# Patient Record
Sex: FEMALE | Race: WHITE | Employment: OTHER | URBAN - NONMETROPOLITAN AREA
[De-identification: names, ages, dates, MRNs, and addresses within clinical notes are randomized per-mention and may not be internally consistent; named-entity substitution may affect disease eponyms.]

---

## 2017-03-14 ENCOUNTER — OFFICE VISIT (OUTPATIENT)
Dept: CARDIOLOGY | Age: 77
End: 2017-03-14

## 2017-03-14 ENCOUNTER — PROCEDURE VISIT (OUTPATIENT)
Dept: CARDIOLOGY | Age: 77
End: 2017-03-14

## 2017-03-14 VITALS
SYSTOLIC BLOOD PRESSURE: 128 MMHG | WEIGHT: 152.4 LBS | HEART RATE: 71 BPM | DIASTOLIC BLOOD PRESSURE: 66 MMHG | HEIGHT: 66 IN | BODY MASS INDEX: 24.49 KG/M2

## 2017-03-14 DIAGNOSIS — Z95.810 BIVENTRICULAR IMPLANTABLE CARDIOVERTER-DEFIBRILLATOR IN SITU: Primary | ICD-10-CM

## 2017-03-14 DIAGNOSIS — I48.0 PAROXYSMAL ATRIAL FIBRILLATION (HCC): ICD-10-CM

## 2017-03-14 DIAGNOSIS — I10 ESSENTIAL HYPERTENSION: ICD-10-CM

## 2017-03-14 DIAGNOSIS — I34.0 MITRAL VALVE INSUFFICIENCY, UNSPECIFIED ETIOLOGY: ICD-10-CM

## 2017-03-14 DIAGNOSIS — I42.9 CARDIOMYOPATHY (HCC): Primary | ICD-10-CM

## 2017-03-14 DIAGNOSIS — I44.7 LBBB (LEFT BUNDLE BRANCH BLOCK): ICD-10-CM

## 2017-03-14 DIAGNOSIS — I50.22 CHRONIC SYSTOLIC CONGESTIVE HEART FAILURE (HCC): ICD-10-CM

## 2017-03-14 DIAGNOSIS — I35.1 AORTIC VALVE INSUFFICIENCY, UNSPECIFIED ETIOLOGY: ICD-10-CM

## 2017-03-14 DIAGNOSIS — Z95.810 BIVENTRICULAR IMPLANTABLE CARDIOVERTER-DEFIBRILLATOR IN SITU: ICD-10-CM

## 2017-03-14 DIAGNOSIS — Z98.890 H/O CARDIAC CATHETERIZATION: ICD-10-CM

## 2017-03-14 PROCEDURE — 93283 PRGRMG EVAL IMPLANTABLE DFB: CPT | Performed by: INTERNAL MEDICINE

## 2017-03-14 PROCEDURE — G8484 FLU IMMUNIZE NO ADMIN: HCPCS | Performed by: INTERNAL MEDICINE

## 2017-03-14 PROCEDURE — G8420 CALC BMI NORM PARAMETERS: HCPCS | Performed by: INTERNAL MEDICINE

## 2017-03-14 PROCEDURE — 1123F ACP DISCUSS/DSCN MKR DOCD: CPT | Performed by: INTERNAL MEDICINE

## 2017-03-14 PROCEDURE — 99213 OFFICE O/P EST LOW 20 MIN: CPT | Performed by: INTERNAL MEDICINE

## 2017-03-14 PROCEDURE — 1090F PRES/ABSN URINE INCON ASSESS: CPT | Performed by: INTERNAL MEDICINE

## 2017-03-14 PROCEDURE — G8427 DOCREV CUR MEDS BY ELIG CLIN: HCPCS | Performed by: INTERNAL MEDICINE

## 2017-03-14 PROCEDURE — 1036F TOBACCO NON-USER: CPT | Performed by: INTERNAL MEDICINE

## 2017-03-14 PROCEDURE — G8400 PT W/DXA NO RESULTS DOC: HCPCS | Performed by: INTERNAL MEDICINE

## 2017-03-14 PROCEDURE — 4040F PNEUMOC VAC/ADMIN/RCVD: CPT | Performed by: INTERNAL MEDICINE

## 2017-03-14 ASSESSMENT — ENCOUNTER SYMPTOMS
GASTROINTESTINAL NEGATIVE: 1
SHORTNESS OF BREATH: 1
EYES NEGATIVE: 1

## 2017-05-17 ENCOUNTER — PROCEDURE VISIT (OUTPATIENT)
Dept: CARDIOLOGY | Age: 77
End: 2017-05-17

## 2017-05-17 DIAGNOSIS — Z95.810 BIVENTRICULAR IMPLANTABLE CARDIOVERTER-DEFIBRILLATOR IN SITU: Primary | ICD-10-CM

## 2017-05-17 PROCEDURE — 93295 DEV INTERROG REMOTE 1/2/MLT: CPT | Performed by: INTERNAL MEDICINE

## 2017-05-17 PROCEDURE — 93296 REM INTERROG EVL PM/IDS: CPT | Performed by: INTERNAL MEDICINE

## 2017-08-23 ENCOUNTER — PROCEDURE VISIT (OUTPATIENT)
Dept: CARDIOLOGY CLINIC | Age: 77
End: 2017-08-23
Payer: MEDICARE

## 2017-08-23 DIAGNOSIS — Z95.810 BIVENTRICULAR IMPLANTABLE CARDIOVERTER-DEFIBRILLATOR IN SITU: Primary | ICD-10-CM

## 2017-08-23 PROCEDURE — 93296 REM INTERROG EVL PM/IDS: CPT | Performed by: INTERNAL MEDICINE

## 2017-08-23 PROCEDURE — 93295 DEV INTERROG REMOTE 1/2/MLT: CPT | Performed by: INTERNAL MEDICINE

## 2017-11-29 ENCOUNTER — PROCEDURE VISIT (OUTPATIENT)
Dept: CARDIOLOGY CLINIC | Age: 77
End: 2017-11-29
Payer: MEDICARE

## 2017-11-29 DIAGNOSIS — Z95.810 BIVENTRICULAR IMPLANTABLE CARDIOVERTER-DEFIBRILLATOR IN SITU: Primary | ICD-10-CM

## 2017-11-29 PROCEDURE — 93296 REM INTERROG EVL PM/IDS: CPT | Performed by: INTERNAL MEDICINE

## 2017-11-29 PROCEDURE — 93295 DEV INTERROG REMOTE 1/2/MLT: CPT | Performed by: INTERNAL MEDICINE

## 2017-11-29 NOTE — PROGRESS NOTES
DR Leanne Greene PT/AFIB Samantha Marquez  [place] [employer] BIV ICD  BATTERY 2.5 YRS REMAINING  ATRIAL IMPEDENCE 443  RV IMPEDENCE 461  LV IMPEDENCE 456  P WAVES 0.8    SHOCK 51  ATRIAL PACED 0%  RV PACED 99%  LV PACED 100%

## 2018-01-24 ENCOUNTER — OFFICE VISIT (OUTPATIENT)
Dept: CARDIOLOGY CLINIC | Age: 78
End: 2018-01-24
Payer: MEDICARE

## 2018-01-24 VITALS
BODY MASS INDEX: 24.91 KG/M2 | DIASTOLIC BLOOD PRESSURE: 72 MMHG | HEART RATE: 71 BPM | HEIGHT: 66 IN | SYSTOLIC BLOOD PRESSURE: 132 MMHG | WEIGHT: 155 LBS

## 2018-01-24 DIAGNOSIS — E78.5 HYPERLIPIDEMIA, UNSPECIFIED HYPERLIPIDEMIA TYPE: ICD-10-CM

## 2018-01-24 DIAGNOSIS — I44.7 LBBB (LEFT BUNDLE BRANCH BLOCK): ICD-10-CM

## 2018-01-24 DIAGNOSIS — I35.1 AORTIC VALVE INSUFFICIENCY, ETIOLOGY OF CARDIAC VALVE DISEASE UNSPECIFIED: ICD-10-CM

## 2018-01-24 DIAGNOSIS — I42.9 CARDIOMYOPATHY, UNSPECIFIED TYPE (HCC): Primary | ICD-10-CM

## 2018-01-24 DIAGNOSIS — Z98.890 H/O CARDIAC CATHETERIZATION: ICD-10-CM

## 2018-01-24 DIAGNOSIS — Z95.810 BIVENTRICULAR IMPLANTABLE CARDIOVERTER-DEFIBRILLATOR IN SITU: ICD-10-CM

## 2018-01-24 DIAGNOSIS — I48.0 PAROXYSMAL ATRIAL FIBRILLATION (HCC): ICD-10-CM

## 2018-01-24 DIAGNOSIS — I10 ESSENTIAL HYPERTENSION: ICD-10-CM

## 2018-01-24 DIAGNOSIS — I34.0 MITRAL VALVE INSUFFICIENCY, UNSPECIFIED ETIOLOGY: ICD-10-CM

## 2018-01-24 PROCEDURE — 93000 ELECTROCARDIOGRAM COMPLETE: CPT | Performed by: INTERNAL MEDICINE

## 2018-01-24 PROCEDURE — 1036F TOBACCO NON-USER: CPT | Performed by: INTERNAL MEDICINE

## 2018-01-24 PROCEDURE — G8427 DOCREV CUR MEDS BY ELIG CLIN: HCPCS | Performed by: INTERNAL MEDICINE

## 2018-01-24 PROCEDURE — 1090F PRES/ABSN URINE INCON ASSESS: CPT | Performed by: INTERNAL MEDICINE

## 2018-01-24 PROCEDURE — G8419 CALC BMI OUT NRM PARAM NOF/U: HCPCS | Performed by: INTERNAL MEDICINE

## 2018-01-24 PROCEDURE — 4040F PNEUMOC VAC/ADMIN/RCVD: CPT | Performed by: INTERNAL MEDICINE

## 2018-01-24 PROCEDURE — 1123F ACP DISCUSS/DSCN MKR DOCD: CPT | Performed by: INTERNAL MEDICINE

## 2018-01-24 PROCEDURE — G8484 FLU IMMUNIZE NO ADMIN: HCPCS | Performed by: INTERNAL MEDICINE

## 2018-01-24 PROCEDURE — 99214 OFFICE O/P EST MOD 30 MIN: CPT | Performed by: INTERNAL MEDICINE

## 2018-01-24 PROCEDURE — G8400 PT W/DXA NO RESULTS DOC: HCPCS | Performed by: INTERNAL MEDICINE

## 2018-01-24 ASSESSMENT — ENCOUNTER SYMPTOMS
EYES NEGATIVE: 1
SHORTNESS OF BREATH: 1
GASTROINTESTINAL NEGATIVE: 1

## 2018-01-24 NOTE — PROGRESS NOTES
No results for input(s): CKTOTAL, CKMB, CKMBINDEX, TROPONINI in the last 72 hours. Assessment:   Dario Scott is known to us with history of Bi-V ICD implantation in 2006 for non-ischemic CPM, and history of paroxysmal atrial fibrillation as well as ventricular tachycardia. She had cardiac cath in 2006 which showed no obstructive CAD. She was found to be in atrial flutter on 2/19/2013, and she underwent successful LINWOOD-[facility] which restored sinus rhythm. She has been tolerating anticoagulation with Xarelto but she is c/o the cost of this medicine. We helped her with signing up for free samples. She reported no new symptoms such SOB or palpitations. No chest pain. She was found to be in atrial fib on 4/8/2015. She was started on amiodarone and we scheduled LINWOOD/cardioversion. This was done successfully and she went back into sinus rhythm. The following diagnoses were addressed during this visit:  1. Cardiomyopathy, unspecified type ([address]  EKG 12 Lead   2. Paroxysmal atrial fibrillation (HCC)  EKG 12 Lead    Cardioversion external   3. [place] ICD     4. Essential hypertension     5. Hyperlipidemia, unspecified hyperlipidemia type     6. LBBB (left bundle branch block)     7. Aortic valve insufficiency, etiology of cardiac valve disease unspecified     8. Mitral valve insufficiency, unspecified etiology     9. H/O cardiac catheterization: 7/21/2006. No obstructive CAD. EF 25%           Plan:   Patient here for 6 month follow-up. Patient has intermittent palpitations.     EKG completed in office shows atypical atrial flutter. She is in atrial flutter. We will attempt to cardiovert the heart into SR by external [facility]. She is on xarelto and she has been taking it religiously. On last device check 2017 showed atrial flutter. It is atypical flutter. She is on Xarelto anyway. Ablation may not be the best option for this elderly patient. Medications reviewed.    Continue

## 2018-01-29 ENCOUNTER — APPOINTMENT (OUTPATIENT)
Dept: CARDIAC CATH/INVASIVE PROCEDURES | Age: 78
End: 2018-01-29
Attending: INTERNAL MEDICINE
Payer: MEDICARE

## 2018-01-29 ENCOUNTER — ANESTHESIA (OUTPATIENT)
Dept: CARDIAC CATH/INVASIVE PROCEDURES | Age: 78
End: 2018-01-29
Payer: MEDICARE

## 2018-01-29 ENCOUNTER — ANESTHESIA EVENT (OUTPATIENT)
Dept: CARDIAC CATH/INVASIVE PROCEDURES | Age: 78
End: 2018-01-29
Payer: MEDICARE

## 2018-01-29 ENCOUNTER — HOSPITAL ENCOUNTER (OUTPATIENT)
Dept: INPATIENT UNIT | Age: 78
Discharge: HOME | End: 2018-01-29
Attending: INTERNAL MEDICINE | Admitting: INTERNAL MEDICINE
Payer: MEDICARE

## 2018-01-29 VITALS
OXYGEN SATURATION: 94 % | DIASTOLIC BLOOD PRESSURE: 70 MMHG | BODY MASS INDEX: 24.91 KG/M2 | HEART RATE: 70 BPM | HEIGHT: 66 IN | WEIGHT: 155 LBS | TEMPERATURE: 97.9 F | SYSTOLIC BLOOD PRESSURE: 137 MMHG | RESPIRATION RATE: 23 BRPM

## 2018-01-29 PROBLEM — Z01.89 ENCOUNTER FOR CARDIOVERSION PROCEDURE: Status: ACTIVE | Noted: 2018-01-29

## 2018-01-29 LAB
ANION GAP SERPL CALCULATED.3IONS-SCNC: 12 MEQ/L (ref 8–16)
BUN BLDV-MCNC: 13 MG/DL (ref 7–22)
CALCIUM SERPL-MCNC: 9.4 MG/DL (ref 8.5–10.5)
CHLORIDE BLD-SCNC: 103 MEQ/L (ref 98–111)
CO2: 27 MEQ/L (ref 23–33)
CREAT SERPL-MCNC: 0.9 MG/DL (ref 0.4–1.2)
GFR SERPL CREATININE-BSD FRML MDRD: 60 ML/MIN/1.73M2
GLUCOSE BLD-MCNC: 109 MG/DL (ref 70–108)
MAGNESIUM: 2.3 MG/DL (ref 1.6–2.4)
POTASSIUM SERPL-SCNC: 4.1 MEQ/L (ref 3.5–5.2)
SODIUM BLD-SCNC: 142 MEQ/L (ref 135–145)

## 2018-01-29 PROCEDURE — 92960 CARDIOVERSION ELECTRIC EXT: CPT | Performed by: INTERNAL MEDICINE

## 2018-01-29 PROCEDURE — 93005 ELECTROCARDIOGRAM TRACING: CPT

## 2018-01-29 PROCEDURE — 83735 ASSAY OF MAGNESIUM: CPT

## 2018-01-29 PROCEDURE — 36415 COLL VENOUS BLD VENIPUNCTURE: CPT

## 2018-01-29 PROCEDURE — 2580000003 HC RX 258: Performed by: INTERNAL MEDICINE

## 2018-01-29 PROCEDURE — 6360000002 HC RX W HCPCS: Performed by: ANESTHESIOLOGY

## 2018-01-29 PROCEDURE — 3700000000 HC ANESTHESIA ATTENDED CARE

## 2018-01-29 PROCEDURE — 80048 BASIC METABOLIC PNL TOTAL CA: CPT

## 2018-01-29 PROCEDURE — 6360000002 HC RX W HCPCS: Performed by: INTERNAL MEDICINE

## 2018-01-29 RX ORDER — SODIUM CHLORIDE 0.9 % (FLUSH) 0.9 %
10 SYRINGE (ML) INJECTION EVERY 12 HOURS SCHEDULED
Status: DISCONTINUED | OUTPATIENT
Start: 2018-01-29 | End: 2018-01-29 | Stop reason: HOSPADM

## 2018-01-29 RX ORDER — FUROSEMIDE 10 MG/ML
20 INJECTION INTRAMUSCULAR; INTRAVENOUS ONCE
Status: DISCONTINUED | OUTPATIENT
Start: 2018-01-29 | End: 2018-01-29 | Stop reason: HOSPADM

## 2018-01-29 RX ORDER — SODIUM CHLORIDE 9 MG/ML
INJECTION, SOLUTION INTRAVENOUS CONTINUOUS
Status: DISCONTINUED | OUTPATIENT
Start: 2018-01-29 | End: 2018-01-29 | Stop reason: HOSPADM

## 2018-01-29 RX ORDER — SODIUM CHLORIDE 0.9 % (FLUSH) 0.9 %
10 SYRINGE (ML) INJECTION PRN
Status: DISCONTINUED | OUTPATIENT
Start: 2018-01-29 | End: 2018-01-29 | Stop reason: HOSPADM

## 2018-01-29 RX ORDER — FUROSEMIDE 10 MG/ML
20 INJECTION INTRAMUSCULAR; INTRAVENOUS ONCE
Status: COMPLETED | OUTPATIENT
Start: 2018-01-29 | End: 2018-01-29

## 2018-01-29 RX ADMIN — PROPOFOL 10 MG: 10 INJECTION, EMULSION INTRAVENOUS at 11:03

## 2018-01-29 RX ADMIN — PROPOFOL 50 MG: 10 INJECTION, EMULSION INTRAVENOUS at 11:02

## 2018-01-29 RX ADMIN — PROPOFOL 10 MG: 10 INJECTION, EMULSION INTRAVENOUS at 11:06

## 2018-01-29 RX ADMIN — PROPOFOL 10 MG: 10 INJECTION, EMULSION INTRAVENOUS at 11:04

## 2018-01-29 RX ADMIN — PROPOFOL 10 MG: 10 INJECTION, EMULSION INTRAVENOUS at 11:05

## 2018-01-29 RX ADMIN — SODIUM CHLORIDE: 9 INJECTION, SOLUTION INTRAVENOUS at 09:16

## 2018-01-29 RX ADMIN — FUROSEMIDE 20 MG: 10 INJECTION, SOLUTION INTRAMUSCULAR; INTRAVENOUS at 11:19

## 2018-01-29 ASSESSMENT — ENCOUNTER SYMPTOMS: SHORTNESS OF BREATH: 1

## 2018-01-29 NOTE — ANESTHESIA PRE PROCEDURE
breath) R06.02    CHF (congestive heart failure) ([facility]) I50.9    LBBB (left bundle branch block) I44.7    Atrial fibrillation ([facility]) I48.91    AI (aortic insufficiency): Mild by echo 5/2012 I35.1    MR (mitral regurgitation): Mild by echo 5/2012 I34.0    H/O echocardiogram: 5/2012 Z92.89    H/O cardiac catheterization: 7/21/2006. No obstructive CAD. EF 25% Z98.890       Past Medical History:        Diagnosis Date    AI (aortic insufficiency): Mild by echo 5/2012 3/25/2013    Mild by Echocardiogram 5/2012    Arthritis     Atrial fibrillation ([address]     [place] [employer] BiV ICD     5/3/2012: Pulse generator replacement. Biv ICD. Assistera. Dr. Samia Moreland  8/3/2006    Cardiomyopathy ([address] 07/21/06    EF 25%    CHF (congestive heart failure) ([address]     functional class III     Gastroesophageal reflux     H/O echocardiogram: 5/2012 3/25/2013    5/2012: EF 50%. Mild MR and mild AI.     Hepatitis     history of    Hyperlipidemia     Hypertension     ICD (implantable cardiac defibrillator), biventricular, in situ 8/3/06    [employer] BiV ICD    LBBB (left bundle branch block)     MR (mitral regurgitation): Mild by echo 5/2012 3/25/2013    Mild by echo 5/2012    SOB (shortness of breath)     VT (ventricular tachycardia) ([address]        Past Surgical History:        Procedure Laterality Date    DIAGNOSTIC CARDIAC CATH LAB PROCEDURE  7/21/06    nonobstructive disease, severe LV systolic dysfunction, mildly elevated LV end diastolic pressure    HYSTERECTOMY  1989    TRANSESOPHAGEAL ECHOCARDIOGRAM  2/19/13    with cardioversion [facility]    TRANSTHORACIC ECHOCARDIOGRAM  10/21/05    EF 25% mild aortic sclerostic changes, mitral valve thickened and calcification present, mild/mod mitral insufficiency severe tricuspid insufficiency, pulmonary htn, mild mitral stenosis and insufficiency       Social History:    Social History   Substance Use Topics    Smoking status: Never Smoker    Smokeless tobacco: Never Used

## 2018-01-29 NOTE — ANESTHESIA POSTPROCEDURE EVALUATION
Department of Anesthesiology  Postprocedure Note    Patient: Dario Scott  MRN: [id number]  YOB: 1940  Date of evaluation: 1/29/2018  Time:  11:48 AM     Procedure Summary     Date:  01/29/18 Room / Location:  [address] [place] [address] CATH LAB    Anesthesia Start:  1100 Anesthesia Stop:  9607    Procedure:  STR CARDIOVERSION W/ ANES Diagnosis:      Scheduled Providers:   Responsible Provider:  Yani Ramírez MD    Anesthesia Type:  general ASA Status:  4          Anesthesia Type: general    Addison Phase I:      Addison Phase II:      Last vitals: Reviewed and per EMR flowsheets. Anesthesia Post Evaluation   [address]  POST-ANESTHESIA NOTE       Name:  Dario Scott                                         Age:  76 y.o.   MRN:  [id number]      Last Vitals:  /74   Pulse 70   Temp 97.9 °F (36.6 °C) (Oral)   Resp 15   Ht 5' 6\" (1.676 m)   Wt 155 lb (70.3 kg)   SpO2 99%   BMI 25.02 kg/m²   Patient Vitals for the past 4 hrs:   BP Temp Temp src Pulse Resp SpO2 Height Weight   01/29/18 1117 134/74 - - 70 15 99 % - -   01/29/18 1112 127/67 - - 70 21 98 % - -   01/29/18 1108 129/76 - - 70 (!) 41 97 % - -   01/29/18 1104 (!) 162/78 - - 73 24 97 % - -   01/29/18 1100 - - - 70 17 97 % - -   01/29/18 0845 (!) 152/89 97.9 °F (36.6 °C) Oral 71 16 96 % 5' 6\" (1.676 m) 155 lb (70.3 kg)       Level of Consciousness:  Awake    Respiratory:  Stable    Oxygen Saturation:  Stable    Cardiovascular:  Stable    Hydration:  Adequate    PONV:  Stable    Post-op Pain:  Adequate analgesia    Post-op Assessment:  No apparent anesthetic complications    Additional Follow-Up / Treatment / Comment:  None    Yani Ramírez MD  January 29, 2018   11:48 AM

## 2018-01-29 NOTE — PROGRESS NOTES
IV was positional and IV fluid infused quickly, Dr Salvador Anis informed, 250 ml normal saline hung at 20 ml per hour on a pump.

## 2018-01-31 LAB
EKG ATRIAL RATE: 375 BPM
EKG ATRIAL RATE: 70 BPM
EKG P-R INTERVAL: 304 MS
EKG Q-T INTERVAL: 440 MS
EKG Q-T INTERVAL: 446 MS
EKG QRS DURATION: 168 MS
EKG QRS DURATION: 170 MS
EKG QTC CALCULATION (BAZETT): 475 MS
EKG QTC CALCULATION (BAZETT): 481 MS
EKG R AXIS: -114 DEGREES
EKG R AXIS: -128 DEGREES
EKG T AXIS: 103 DEGREES
EKG T AXIS: 44 DEGREES
EKG VENTRICULAR RATE: 70 BPM
EKG VENTRICULAR RATE: 70 BPM

## 2018-02-02 NOTE — H&P
Nii Montalvo MD   aspirin 81 MG tablet Take 6 tablets by mouth daily. Patient taking differently: Take 81 mg by mouth daily  2/19/13  Yes Nii Montalvo MD     Additional information:       VITAL SIGNS   Vitals:    01/29/18 1317   BP: 137/70   Pulse: 70   Resp: 23   Temp:    SpO2:        PHYSICAL:   Heart:  [x]Regular rate and rhythm  []Other:    Lungs:  [x]Clear    []Other:    Abdomen: [x]Soft    []Other:    Mental Status: [x]Alert & Oriented  []Other:      PLANNED PROCEDURE   []LINWOOD  []Pacemaker/ICD [x]Cardioversion  []Cath  []PCI   []LOOP RECORDER INSERTION OR REMOVAL  []Peripheral angiography      SEDATION  Planned agent:[x]Midazolam []Meperidine [x]Sublimaze []Morphine  []Diazepam  []Other:     ASA Classification:  []1 []2 [x]3 []4 []5  Class 1: A normal healthy patient  Class 2: Pt with mild to moderate systemic disease  Class 3: Severe systemic disease or disturbance  Class 4: Severe systemic disorders that are already life threatening. Class 5: Moribund pt with little chances of survival, for more than 24 hours. Mallampati I Airway Classification:   []1 []2 [x]3 []4    [x]Pre-procedure diagnostic studies complete and results available. Comment:    [x]Previous sedation/anesthesia experiences assessed. Comment:    [x]The patient is an appropriate candidate to undergo the planned procedure sedation and anesthesia. (Refer to nursing sedation/analgesia documentation record)  [x]Formulation and discussion of sedation/procedure plan, risks, and expectations with patient and/or responsible adult completed. [x]Patient examined immediately prior to the procedure.  (Refer to nursing sedation/analgesia documentation record)    Nii Montalvo  Electronically signed 2/1/2018 at 8:02 PM

## 2018-03-20 ENCOUNTER — NURSE ONLY (OUTPATIENT)
Dept: CARDIOLOGY CLINIC | Age: 78
End: 2018-03-20
Payer: MEDICARE

## 2018-03-20 DIAGNOSIS — Z95.810 BIVENTRICULAR IMPLANTABLE CARDIOVERTER-DEFIBRILLATOR IN SITU: Primary | ICD-10-CM

## 2018-03-20 PROCEDURE — 93284 PRGRMG EVAL IMPLANTABLE DFB: CPT | Performed by: INTERNAL MEDICINE

## 2018-04-28 ENCOUNTER — HOSPITAL ENCOUNTER (EMERGENCY)
Age: 78
Discharge: HOME | End: 2018-04-28
Payer: MEDICARE

## 2018-04-28 ENCOUNTER — APPOINTMENT (OUTPATIENT)
Dept: GENERAL RADIOLOGY | Age: 78
End: 2018-04-28
Payer: MEDICARE

## 2018-04-28 ENCOUNTER — APPOINTMENT (OUTPATIENT)
Dept: CT IMAGING | Age: 78
End: 2018-04-28
Payer: MEDICARE

## 2018-04-28 VITALS
BODY MASS INDEX: 23.89 KG/M2 | DIASTOLIC BLOOD PRESSURE: 80 MMHG | TEMPERATURE: 97.8 F | RESPIRATION RATE: 11 BRPM | HEART RATE: 72 BPM | OXYGEN SATURATION: 97 % | WEIGHT: 148 LBS | SYSTOLIC BLOOD PRESSURE: 137 MMHG

## 2018-04-28 DIAGNOSIS — R07.89 ATYPICAL CHEST PAIN: Primary | ICD-10-CM

## 2018-04-28 LAB
ALBUMIN SERPL-MCNC: 4.2 G/DL (ref 3.5–5.1)
ALP BLD-CCNC: 67 U/L (ref 38–126)
ALT SERPL-CCNC: 14 U/L (ref 11–66)
ANION GAP SERPL CALCULATED.3IONS-SCNC: 14 MEQ/L (ref 8–16)
AST SERPL-CCNC: 20 U/L (ref 5–40)
BASOPHILS # BLD: 1.5 %
BASOPHILS ABSOLUTE: 0.1 THOU/MM3 (ref 0–0.1)
BILIRUB SERPL-MCNC: 0.5 MG/DL (ref 0.3–1.2)
BILIRUBIN DIRECT: < 0.2 MG/DL (ref 0–0.3)
BUN BLDV-MCNC: 21 MG/DL (ref 7–22)
CALCIUM SERPL-MCNC: 9.2 MG/DL (ref 8.5–10.5)
CHLORIDE BLD-SCNC: 98 MEQ/L (ref 98–111)
CO2: 26 MEQ/L (ref 23–33)
CREAT SERPL-MCNC: 0.8 MG/DL (ref 0.4–1.2)
DIGOXIN LEVEL: 1.4 NG/ML (ref 0.5–2)
EKG ATRIAL RATE: 74 BPM
EKG Q-T INTERVAL: 438 MS
EKG QRS DURATION: 166 MS
EKG QTC CALCULATION (BAZETT): 486 MS
EKG R AXIS: -154 DEGREES
EKG T AXIS: 60 DEGREES
EKG VENTRICULAR RATE: 74 BPM
EOSINOPHIL # BLD: 4 %
EOSINOPHILS ABSOLUTE: 0.3 THOU/MM3 (ref 0–0.4)
GFR SERPL CREATININE-BSD FRML MDRD: 69 ML/MIN/1.73M2
GLUCOSE BLD-MCNC: 102 MG/DL (ref 70–108)
HCT VFR BLD CALC: 42.1 % (ref 37–47)
HEMOGLOBIN: 14.2 GM/DL (ref 12–16)
LYMPHOCYTES # BLD: 25.5 %
LYMPHOCYTES ABSOLUTE: 1.9 THOU/MM3 (ref 1–4.8)
MACROCYTES: ABNORMAL
MCH RBC QN AUTO: 33.9 PG (ref 27–31)
MCHC RBC AUTO-ENTMCNC: 33.8 GM/DL (ref 33–37)
MCV RBC AUTO: 100.4 FL (ref 81–99)
MONOCYTES # BLD: 7.5 %
MONOCYTES ABSOLUTE: 0.6 THOU/MM3 (ref 0.4–1.3)
NUCLEATED RED BLOOD CELLS: 0 /100 WBC
OSMOLALITY CALCULATION: 278.8 MOSMOL/KG (ref 275–300)
PDW BLD-RTO: 13 % (ref 11.5–14.5)
PLATELET # BLD: 240 THOU/MM3 (ref 130–400)
PMV BLD AUTO: 8.1 FL (ref 7.4–10.4)
POTASSIUM SERPL-SCNC: 4.2 MEQ/L (ref 3.5–5.2)
RBC # BLD: 4.19 MILL/MM3 (ref 4.2–5.4)
SEG NEUTROPHILS: 61.5 %
SEGMENTED NEUTROPHILS ABSOLUTE COUNT: 4.6 THOU/MM3 (ref 1.8–7.7)
SODIUM BLD-SCNC: 138 MEQ/L (ref 135–145)
TOTAL PROTEIN: 6.7 G/DL (ref 6.1–8)
TROPONIN T: < 0.01 NG/ML
WBC # BLD: 7.4 THOU/MM3 (ref 4.8–10.8)

## 2018-04-28 PROCEDURE — 36415 COLL VENOUS BLD VENIPUNCTURE: CPT

## 2018-04-28 PROCEDURE — 71045 X-RAY EXAM CHEST 1 VIEW: CPT

## 2018-04-28 PROCEDURE — 85025 COMPLETE CBC W/AUTO DIFF WBC: CPT

## 2018-04-28 PROCEDURE — 80053 COMPREHEN METABOLIC PANEL: CPT

## 2018-04-28 PROCEDURE — 93005 ELECTROCARDIOGRAM TRACING: CPT | Performed by: EMERGENCY MEDICINE

## 2018-04-28 PROCEDURE — 99285 EMERGENCY DEPT VISIT HI MDM: CPT

## 2018-04-28 PROCEDURE — 82248 BILIRUBIN DIRECT: CPT

## 2018-04-28 PROCEDURE — 80162 ASSAY OF DIGOXIN TOTAL: CPT

## 2018-04-28 PROCEDURE — 84484 ASSAY OF TROPONIN QUANT: CPT

## 2018-04-28 PROCEDURE — 70450 CT HEAD/BRAIN W/O DYE: CPT

## 2018-04-28 ASSESSMENT — HEART SCORE: ECG: 1

## 2018-04-28 ASSESSMENT — ENCOUNTER SYMPTOMS
VOMITING: 0
EYE PAIN: 0
EYE DISCHARGE: 0
SHORTNESS OF BREATH: 0
RHINORRHEA: 0
NAUSEA: 0
ABDOMINAL PAIN: 0
SORE THROAT: 0
BACK PAIN: 0
DIARRHEA: 0
COUGH: 0
WHEEZING: 0

## 2018-06-27 ENCOUNTER — PROCEDURE VISIT (OUTPATIENT)
Dept: CARDIOLOGY CLINIC | Age: 78
End: 2018-06-27

## 2018-06-27 DIAGNOSIS — Z95.810 BIVENTRICULAR IMPLANTABLE CARDIOVERTER-DEFIBRILLATOR IN SITU: Primary | ICD-10-CM

## 2018-10-03 ENCOUNTER — PROCEDURE VISIT (OUTPATIENT)
Dept: CARDIOLOGY CLINIC | Age: 78
End: 2018-10-03
Payer: MEDICARE

## 2018-10-03 DIAGNOSIS — Z95.810 BIVENTRICULAR IMPLANTABLE CARDIOVERTER-DEFIBRILLATOR IN SITU: Primary | ICD-10-CM

## 2018-10-03 PROCEDURE — 93295 DEV INTERROG REMOTE 1/2/MLT: CPT | Performed by: INTERNAL MEDICINE

## 2018-10-03 PROCEDURE — 93296 REM INTERROG EVL PM/IDS: CPT | Performed by: INTERNAL MEDICINE

## 2018-11-27 ENCOUNTER — OFFICE VISIT (OUTPATIENT)
Dept: CARDIOLOGY CLINIC | Age: 78
End: 2018-11-27
Payer: MEDICARE

## 2018-11-27 VITALS
HEIGHT: 64 IN | WEIGHT: 139.4 LBS | SYSTOLIC BLOOD PRESSURE: 116 MMHG | DIASTOLIC BLOOD PRESSURE: 60 MMHG | BODY MASS INDEX: 23.8 KG/M2 | HEART RATE: 70 BPM

## 2018-11-27 DIAGNOSIS — I48.0 PAROXYSMAL ATRIAL FIBRILLATION (HCC): ICD-10-CM

## 2018-11-27 DIAGNOSIS — Z98.890 H/O CARDIAC CATHETERIZATION: ICD-10-CM

## 2018-11-27 DIAGNOSIS — I10 ESSENTIAL HYPERTENSION: ICD-10-CM

## 2018-11-27 DIAGNOSIS — I50.22 CHRONIC SYSTOLIC CONGESTIVE HEART FAILURE (HCC): ICD-10-CM

## 2018-11-27 DIAGNOSIS — Z92.89 H/O ECHOCARDIOGRAM: ICD-10-CM

## 2018-11-27 DIAGNOSIS — I44.7 LBBB (LEFT BUNDLE BRANCH BLOCK): ICD-10-CM

## 2018-11-27 DIAGNOSIS — Z95.810 BIVENTRICULAR IMPLANTABLE CARDIOVERTER-DEFIBRILLATOR IN SITU: ICD-10-CM

## 2018-11-27 DIAGNOSIS — E78.00 PURE HYPERCHOLESTEROLEMIA: ICD-10-CM

## 2018-11-27 DIAGNOSIS — I42.0 NONISCHEMIC DILATED CARDIOMYOPATHY (HCC): Primary | ICD-10-CM

## 2018-11-27 PROCEDURE — 1123F ACP DISCUSS/DSCN MKR DOCD: CPT | Performed by: INTERNAL MEDICINE

## 2018-11-27 PROCEDURE — 1036F TOBACCO NON-USER: CPT | Performed by: INTERNAL MEDICINE

## 2018-11-27 PROCEDURE — G8420 CALC BMI NORM PARAMETERS: HCPCS | Performed by: INTERNAL MEDICINE

## 2018-11-27 PROCEDURE — 99214 OFFICE O/P EST MOD 30 MIN: CPT | Performed by: INTERNAL MEDICINE

## 2018-11-27 PROCEDURE — 4040F PNEUMOC VAC/ADMIN/RCVD: CPT | Performed by: INTERNAL MEDICINE

## 2018-11-27 PROCEDURE — G8484 FLU IMMUNIZE NO ADMIN: HCPCS | Performed by: INTERNAL MEDICINE

## 2018-11-27 PROCEDURE — G8400 PT W/DXA NO RESULTS DOC: HCPCS | Performed by: INTERNAL MEDICINE

## 2018-11-27 PROCEDURE — 93000 ELECTROCARDIOGRAM COMPLETE: CPT | Performed by: INTERNAL MEDICINE

## 2018-11-27 PROCEDURE — 1090F PRES/ABSN URINE INCON ASSESS: CPT | Performed by: INTERNAL MEDICINE

## 2018-11-27 PROCEDURE — 1101F PT FALLS ASSESS-DOCD LE1/YR: CPT | Performed by: INTERNAL MEDICINE

## 2018-11-27 PROCEDURE — G8427 DOCREV CUR MEDS BY ELIG CLIN: HCPCS | Performed by: INTERNAL MEDICINE

## 2018-11-27 NOTE — PROGRESS NOTES
Chief Complaint   Patient presents with    6 Month Follow-Up    Check-Up   Former pat of Dr. Mario Gresham  She is known to us with history of Bi-V ICD implantation in 2006 for non-ischemic CPM, and history of paroxysmal atrial fibrillation     Pt here for a 6 month f/u  EKG done today    Denied cp, dizziness, edema or palpitations  Sob on exertion chronic    Records reviewed        Patient Active Problem List   Diagnosis    [employer] BiV ICD    Cardiomyopathy [facility])    [facility]sust[facility]    Hypertension    Hyperlipidemia    Gastroesophageal reflux    SOB (shortness of breath)    CHF (congestive heart failure) (HCC)    LBBB (left bundle branch block)    Atrial fibrillation ([address]    AI (aortic insufficiency): Mild by echo 5/2012    MR (mitral regurgitation): Mild by echo 5/2012    H/O echocardiogram: 5/2012    H/O cardiac catheterization: 7/21/2006. No obstructive CAD.  EF 25%    Encounter for cardioversion procedure: 1/29/2018    Nonischemic dilated cardiomyopathy [facility])       Past Surgical History:   Procedure Laterality Date    DIAGNOSTIC CARDIAC CATH LAB PROCEDURE  7/21/06    nonobstructive disease, severe LV systolic dysfunction, mildly elevated LV end diastolic pressure    HYSTERECTOMY  1989    TRANSESOPHAGEAL ECHOCARDIOGRAM  2/19/13    with [facility]    TRANSTHORACIC ECHOCARDIOGRAM  10/21/05    EF 25% mild aortic sclerostic changes, mitral valve thickened and calcification present, mild/mod mitral insufficiency severe tricuspid insufficiency, pulmonary htn, mild mitral stenosis and insufficiency       No Known Allergies     Family History   Problem Relation Age of Onset    Heart Disease Mother 58    Heart Disease Father 79    Stroke Sister     Cancer Other     Diabetes Neg Hx     High Blood Pressure Neg Hx         Social History     Social History    Marital status:      Spouse name: N/A    Number of children: N/A    Years of education: N/A Component Value Date    TSH 1.590 04/10/2015       EKG   Ventri paced rhythm    Assessment   Diagnosis Orders   1. Nonischemic dilated cardiomyopathy ([address]     2. Chronic systolic congestive heart failure (HCC)     3. Pure hypercholesterolemia     4. Essential hypertension     5. Paroxysmal atrial fibrillation (HCC)  EKG 12 lead   6. H/O cardiac catheterization: 7/21/2006. No obstructive CAD. EF 25%     7. LBBB (left bundle branch block)     8. [place] ICD     9. H/O echocardiogram: 5/2012                     The meds and labs reviewed    BIV-ICD check- okay     PT/AFIB/XARELTO  AFIB BURDEN <0.1%  Get In BIV ICD REMOTE   BATTERY 1.5 YRS REMAINING  ATRIAL IMPEDENCE 459  RV IMPEDENCE 460  LV IMPEDENCE 447  SHOCK 54  A PACED 100%  RV AND LV PACED 100%  DDDR   1 EPISODE OF AFIB    Cardiomyopathy: improving, no CHF symptoms, no change in clinical condition. Will need periodic echocardiograms depending on symptoms.     Congestive heart failure: no evidence of fluid overload today, no recent hospitalization for CHF    PAF  On OA    RTC in 6 months      [facility]

## 2019-01-28 ENCOUNTER — PROCEDURE VISIT (OUTPATIENT)
Dept: CARDIOLOGY CLINIC | Age: 79
End: 2019-01-28
Payer: MEDICARE

## 2019-01-28 DIAGNOSIS — Z95.810 BIVENTRICULAR IMPLANTABLE CARDIOVERTER-DEFIBRILLATOR IN SITU: Primary | ICD-10-CM

## 2019-01-28 PROCEDURE — 93295 DEV INTERROG REMOTE 1/2/MLT: CPT | Performed by: INTERNAL MEDICINE

## 2019-01-28 PROCEDURE — 93296 REM INTERROG EVL PM/IDS: CPT | Performed by: INTERNAL MEDICINE

## 2020-02-18 ENCOUNTER — HOSPITAL ENCOUNTER (OUTPATIENT)
Dept: INPATIENT UNIT | Age: 80
Discharge: HOME | End: 2020-02-18
Attending: INTERNAL MEDICINE | Admitting: INTERNAL MEDICINE
Payer: MEDICARE

## 2020-02-18 VITALS
HEART RATE: 71 BPM | TEMPERATURE: 97.9 F | RESPIRATION RATE: 16 BRPM | SYSTOLIC BLOOD PRESSURE: 120 MMHG | WEIGHT: 150 LBS | DIASTOLIC BLOOD PRESSURE: 61 MMHG | HEIGHT: 66 IN | BODY MASS INDEX: 24.11 KG/M2 | OXYGEN SATURATION: 97 %

## 2020-02-18 LAB
ANION GAP SERPL CALCULATED.3IONS-SCNC: 11 MEQ/L (ref 8–16)
BUN BLDV-MCNC: 19 MG/DL (ref 7–22)
CALCIUM SERPL-MCNC: 9.5 MG/DL (ref 8.5–10.5)
CHLORIDE BLD-SCNC: 103 MEQ/L (ref 98–111)
CO2: 28 MEQ/L (ref 23–33)
CREAT SERPL-MCNC: 0.9 MG/DL (ref 0.4–1.2)
EKG ATRIAL RATE: 39 BPM
EKG Q-T INTERVAL: 430 MS
EKG QRS DURATION: 164 MS
EKG QTC CALCULATION (BAZETT): 490 MS
EKG R AXIS: -84 DEGREES
EKG T AXIS: 70 DEGREES
EKG VENTRICULAR RATE: 78 BPM
ERYTHROCYTE [DISTWIDTH] IN BLOOD BY AUTOMATED COUNT: 11.9 % (ref 11.5–14.5)
ERYTHROCYTE [DISTWIDTH] IN BLOOD BY AUTOMATED COUNT: 45.2 FL (ref 35–45)
GFR SERPL CREATININE-BSD FRML MDRD: 60 ML/MIN/1.73M2
GLUCOSE BLD-MCNC: 100 MG/DL (ref 70–108)
HCT VFR BLD CALC: 45.6 % (ref 37–47)
HEMOGLOBIN: 15 GM/DL (ref 12–16)
MCH RBC QN AUTO: 33.9 PG (ref 26–33)
MCHC RBC AUTO-ENTMCNC: 32.9 GM/DL (ref 32.2–35.5)
MCV RBC AUTO: 102.9 FL (ref 81–99)
PLATELET # BLD: 222 THOU/MM3 (ref 130–400)
PMV BLD AUTO: 9.3 FL (ref 9.4–12.4)
POTASSIUM REFLEX MAGNESIUM: 4.1 MEQ/L (ref 3.5–5.2)
RBC # BLD: 4.43 MILL/MM3 (ref 4.2–5.4)
SODIUM BLD-SCNC: 142 MEQ/L (ref 135–145)
WBC # BLD: 9.8 THOU/MM3 (ref 4.8–10.8)

## 2020-02-18 PROCEDURE — 2709999900 HC NON-CHARGEABLE SUPPLY

## 2020-02-18 PROCEDURE — 36415 COLL VENOUS BLD VENIPUNCTURE: CPT

## 2020-02-18 PROCEDURE — 2580000003 HC RX 258: Performed by: INTERNAL MEDICINE

## 2020-02-18 PROCEDURE — 33264 RMVL & RPLCMT DFB GEN MLT LD: CPT | Performed by: INTERNAL MEDICINE

## 2020-02-18 PROCEDURE — 93005 ELECTROCARDIOGRAM TRACING: CPT | Performed by: INTERNAL MEDICINE

## 2020-02-18 PROCEDURE — C1882 AICD, OTHER THAN SING/DUAL: HCPCS

## 2020-02-18 PROCEDURE — 80048 BASIC METABOLIC PNL TOTAL CA: CPT

## 2020-02-18 PROCEDURE — 85027 COMPLETE CBC AUTOMATED: CPT

## 2020-02-18 PROCEDURE — 2780000010 HC IMPLANT OTHER

## 2020-02-18 RX ORDER — SODIUM CHLORIDE 0.9 % (FLUSH) 0.9 %
10 SYRINGE (ML) INJECTION PRN
Status: DISCONTINUED | OUTPATIENT
Start: 2020-02-18 | End: 2020-02-18 | Stop reason: HOSPADM

## 2020-02-18 RX ORDER — ACETAMINOPHEN 325 MG/1
650 TABLET ORAL EVERY 4 HOURS PRN
Status: DISCONTINUED | OUTPATIENT
Start: 2020-02-18 | End: 2020-02-18 | Stop reason: HOSPADM

## 2020-02-18 RX ORDER — SODIUM CHLORIDE 0.9 % (FLUSH) 0.9 %
10 SYRINGE (ML) INJECTION EVERY 12 HOURS SCHEDULED
Status: DISCONTINUED | OUTPATIENT
Start: 2020-02-18 | End: 2020-02-18

## 2020-02-18 RX ORDER — SODIUM CHLORIDE 0.9 % (FLUSH) 0.9 %
10 SYRINGE (ML) INJECTION PRN
Status: DISCONTINUED | OUTPATIENT
Start: 2020-02-18 | End: 2020-02-18

## 2020-02-18 RX ORDER — SODIUM CHLORIDE 9 MG/ML
INJECTION, SOLUTION INTRAVENOUS CONTINUOUS
Status: DISCONTINUED | OUTPATIENT
Start: 2020-02-18 | End: 2020-02-18 | Stop reason: HOSPADM

## 2020-02-18 RX ORDER — SODIUM CHLORIDE 0.9 % (FLUSH) 0.9 %
10 SYRINGE (ML) INJECTION EVERY 12 HOURS SCHEDULED
Status: DISCONTINUED | OUTPATIENT
Start: 2020-02-18 | End: 2020-02-18 | Stop reason: HOSPADM

## 2020-02-18 RX ADMIN — SODIUM CHLORIDE: 9 INJECTION, SOLUTION INTRAVENOUS at 09:25

## 2020-02-18 NOTE — PRE SEDATION
[address] [address]  Sedation/Analgesia History & Physical    Pt Name: Miles Moralez  Account number: [de-identified]  MRN: [id number]  YOB: 1940  Provider Performing Procedure: Teddy Andrade MD [facility] [place]  Primary Care Physician: Diana West  Date: 2/18/2020    PRE-PROCEDURE    Code Status: FULL CODE  Brief History/Pre-Procedure Izzy العراقي has history of CHF and S/P CRT-D which was found to be at RRT ( recommended replacemnet time). She will benefit from CRT-D battery change out. MEDICAL HISTORY  []ASHD/ANGINA/MI/CHF   []Hypertension  []Diabetes  []Hyperlipidemia  []Smoking  []Family Hx of ASHD  []Additional information:       has a past medical history of AI (aortic insufficiency): Mild by echo 5/2012, Arthritis, Atrial fibrillation ([facility] [place] [employer] BiV ICD, Cardiomyopathy [facility]), CHF (congestive heart failure) ([facility] Encounter for cardioversion procedure: 1/29/2018, Gastroesophageal reflux, H/O echocardiogram: 5/2012, Hepatitis, Hyperlipidemia, Hypertension, ICD (implantable cardiac defibrillator), biventricular, in situ, LBBB (left bundle branch block), MR (mitral regurgitation): Mild by echo 5/2012, SOB (shortness of breath), and VT (ventricular tachycardia) ([facility] SURGICAL HISTORY   has a past surgical history that includes Diagnostic Cardiac Cath Lab Procedure (7/21/06); transthoracic echocardiogram (10/21/05); Hysterectomy (1989); and transesophageal echocardiogram (2/19/13).   Additional information:       ALLERGIES   Allergies as of 02/18/2020    (No Known Allergies)     Additional information:       MEDICATIONS   Aspirin  [x] 81 mg  [] 325 mg  [] None  Antiplatelet drug therapy use last 5 days  [x]No []Yes  Coumadin Use Last 5 Days [x]No []Yes  Other anticoagulant use last 5 days  [x]No []Yes    Current Facility-Administered Medications:     bacitracin 50,000 Units in sodium chloride 0.9 % 500 mL irrigation, 50,000 Units, Topical, Once, Patricio Bartlett MD    sodium chloride flush 0.9 % injection 10 mL, 10 mL, Intravenous, 2 times per day, Sathish Montoya MD    sodium chloride flush 0.9 % injection 10 mL, 10 mL, Intravenous, PRN, Sathish Mnotoya MD    ceFAZolin (ANCEF) 2 g in dextrose 5 % 50 mL IVPB, 2 g, Intravenous, On Call to Antonio Vanessa MD  Prior to Admission medications    Medication Sig Start Date End Date Taking? Authorizing Provider   aspirin 81 MG tablet Take 81 mg by mouth daily   Yes Historical Provider, MD   rivaroxaban (XARELTO) 20 MG TABS tablet TAKE 1 TABLET  DAILY. 8/17/16  Yes Eileen Edwards MD   B Complex Vitamins (VITAMIN B-COMPLEX PO) Take 1 tablet by mouth Daily in winter twice daily in summer    Yes Historical Provider, MD   Cholecalciferol (VITAMIN D PO) Take 1 capsule by mouth daily    Yes Historical Provider, MD   digoxin (LANOXIN) 0.125 MG tablet Take 1 tablet by mouth daily. 3/26/14  Yes Eileen Edwards MD   metoprolol (TOPROL XL) 100 MG XL tablet Take 1 tablet by mouth daily. 3/26/14  Yes Eileen Edwards MD   furosemide (LASIX) 20 MG tablet Take 1 tablet by mouth daily.  3/26/14  Yes Eileen Edwards MD     Additional information:       VITAL SIGNS   Vitals:    02/18/20 0800   BP: 127/73   Pulse: 75   Resp: 20   Temp: 97.6 °F (36.4 °C)   SpO2: 97%       PHYSICAL:   General: No acute distress  HEENT:  Unremarkable for age  Neck: without increased JVD, carotid pulses 2+ bilaterally without bruits  Heart: RRR, S1 & S2 WNL, S4 gallop, without murmurs or rubs    Lungs: Clear to auscultation    Abdomen: BS present, without HSM, masses, or tenderness    Extremities: without C,C,E.  Pulses 2+ bilaterally  Mental Status: Alert & Oriented        PLANNED PROCEDURE   [x]CRT-D battery change-out    SEDATION  Planned agent:[x]Midazolam And Fentanyl     ASA Classification:  []1 []2 [x]3 []4 []5  Class 1: A normal healthy patient  Class 2: Pt with mild to moderate systemic disease  Class 3: Severe systemic disease or

## 2020-02-18 NOTE — OP NOTE
[address] [address]  Sedation/Analgesia Post Sedation Record    Pt Name: Becki Hedrick  Account number: [de-identified]  MRN: [id number]  YOB: 1940  Procedure Performed By: Yarelis Tuttle MD [facility]  Primary Care Physician: Georgia Campos  Date: 2/18/2020    POST-PROCEDURE    Physicians/Assistants: Yarelis Tuttle MD [facility]  Procedure Performed:     1- CRT-D battery change-out  2- Antibiotic impregnated pouch (TYRX pouch)     Sedation/Anesthesia: Versed/ Fentanyl and 2% xylocaine local anesthesia. Estimated Blood Loss: < 10 ml. Specimens Removed: None       Disposition of Specimen: N/A      Complications: None Immediate      Post-procedure Diagnosis/Findings:    1- Chronic CHF FC II  2- Dilated cardiomyopathy  3- LBBB  4- Chronic atrial fibrillation  5- CRT-D          Recommendations:     1- No shower for 10 days. 2- No elbow lifting (on the side of the implanted cardiac device) for one month  3- Sponge bath from the belly-button down  4- Take off the bandage of the implanted device within 48 hours  5- Consult with your physician if you notice any discharge or bleeding from the device incision  6- Do not clean the incision or add anything including topical antibiotic without physician consultation during the first 10 days.   7- No driving for 7 days ( preferably one month)  8- Arm Sling for one month  9- Continue on same cardiac medications   10- Follow up with Dr. Nighat Garner in 1-2 weeks               Yarelis Tuttle MD [facility]  Electronically signed 2/18/2020 at 11:30 AM

## 2020-02-18 NOTE — PLAN OF CARE
Discharge instructions given to pt and son, Yifan Room   Both \"voiced understanding\"    \"ready to go home\"    Up in room with help and doing well   Pt has dentures, glasses, home meds with her   Dressing to left shoulder dry and intact   Arm sling applied to left arm and instructed to wear   Discharged per wc per transport team with personal belongings with no needs

## 2020-02-19 ENCOUNTER — FOLLOWUP TELEPHONE ENCOUNTER (OUTPATIENT)
Dept: INPATIENT UNIT | Age: 80
End: 2020-02-19

## 2020-02-19 PROCEDURE — 93010 ELECTROCARDIOGRAM REPORT: CPT | Performed by: INTERNAL MEDICINE

## 2020-02-19 NOTE — PROCEDURES
[address] [place] [address]                                 PROCEDURE NOTE    PATIENT NAME: Anaya Greer                   :        1940  MED REC NO:   [id number]                           ROOM:       0009  ACCOUNT NO:   [de-identified]                           ADMIT DATE: 2020  PROVIDER:     YOEL Malik Forget:  2020    REFERRING PROVIDER:  Claudeen Ponto, MD at [facility]. OPERATING SURGEON:  Leonarda Fatima MD    LOCATION:  [facility]. PREOPERATIVE DIAGNOSES:  1. Chronic congestive heart failure. 2.  Dilated cardiomyopathy. 3.  Left bundle branch block. 4.  Chronic atrial fibrillation. 5.  BiV ICD in situ. POSTOPERATIVE DIAGNOSES:  1. Chronic congestive heart failure. 2.  Dilated cardiomyopathy. 3.  Left bundle branch block. 4.  Chronic atrial fibrillation. 5.  BiV ICD in situ. PROCEDURE PERFORMED:  1. BiV ICD battery removal.  The removed BiV ICD battery is [place] 6626 with a serial [id number], which was implanted in  2012.  2.  Implantation of new BiV ICD battery. New BiV ICD battery is [address] with a serial [id number].  3. Intraprocedure BiV ICD lead evaluation. Previous implant right  atrial lead evaluated. Right atrial lead is [employer] [id number] with serial  [id number]. Sensing threshold 1.5 volt with impedance 443 ohms. The  patient in persistent atrial fibrillation. Previously implanted right  ventricular defibrillator lead. Right ventricular defibrillator lead is  [employer] [id number] with serial [id number]. Sensing threshold 15.1 mV. Capture  threshold 0.8 volts at 0.4 milliseconds and impedance 461 ohms and shock  impedance at 47 ohms. Previously implanted coronary sinus/LV lead. The  LV lead is [employer] . Serial [id number]. Sensing threshold 25 mV.    Capturing threshold 1.4 volts at 0.8 milliseconds with configuration LV  tip to RV coil, impedance 486 ohms. 4.  The previous implant BiV ICD was placed in the header of the new BiV  ICD battery and five sets of screws torqued. 5.  Implantation of antibiotic impregnated pouch to prevent from  infection. The impregnated pouch is TYRX pouch. RECOMMENDATIONS:  1.  No shower for seven days. 2.  Remove the bandage of the incision of the BiV ICD in 48 hours. 3.  Sponge bath from belly button down for the next 7 to 10 days. 4.  Avoid lifting the elbow for one month. 5.  Follow up with Dr. Horace Bravo in pacemaker clinic in 5 to 7 days to  evaluate for the incision healing process. 6.  Continue current medication under the guidance of Dr. Horace Bravo. DESCRIPTION OF PROCEDURE:  The patient is an 78-year-old white female  with a history of congestive heart failure; nonischemic cardiomyopathy;  left bundle-branch block, status post BiV ICD, who we noticed to have  significant wear of her BiV ICD which was with an indication of  recommended replacement time. The procedure was discussed with the  patient and her family including pros and cons and possible  complications. The patient was brought to hybrid lab at [facility]. Her left pectoral area was prepped under sterile  circumstances using ChloraPrep. The skin was numbed with lidocaine 1%. The patient was given fentanyl and midazolam intravenously. Incision  was made using sharp dissection, blunt dissection, and electrocautery. Special attention was given to avoid damaging the previously implanted  biventricular ICD lead. The old BiV ICD battery was taken out of the  pocket and the leads were released from the header of the device by  unscrewing the screws. The leads were placed in the header of the new  device and the screws were torqued x5. The BiV ICD pouch was irrigated  copiously with antibiotic. The TYRX pouch was placed in the pocket to  prevent from infection. The device was placed in the pocket. This  incision was closed with continuous 2-0 Polysorb, 4-0 Polysorb,  Steri-Strips, and roll of 4x4. The device was programmed to optimal  setting as previously done. ESTIMATED BLOOD LOSS:  Less than 10 mL. IV FLUIDS:  Normal saline. SPECIMEN:  None. ACUTE COMPLICATIONS:  None. The patient tolerated the procedure well. Thank you for letting us participate in the management of this patient. If you have any questions or concerns, please let us know.         Martha Giron M.D.    D: 02/18/2020 11:38:50       T: 02/18/2020 12:37:02     THEA/ALMA [email or web address]  Job#: [id number]     Doc#: [id number]    CC: